# Patient Record
Sex: FEMALE
[De-identification: names, ages, dates, MRNs, and addresses within clinical notes are randomized per-mention and may not be internally consistent; named-entity substitution may affect disease eponyms.]

---

## 2018-01-08 ENCOUNTER — APPOINTMENT (OUTPATIENT)
Dept: ENDOCRINOLOGY | Facility: CLINIC | Age: 38
End: 2018-01-08
Payer: SELF-PAY

## 2018-01-08 VITALS
HEART RATE: 73 BPM | WEIGHT: 143 LBS | HEIGHT: 65 IN | SYSTOLIC BLOOD PRESSURE: 144 MMHG | DIASTOLIC BLOOD PRESSURE: 93 MMHG | TEMPERATURE: 98.2 F | BODY MASS INDEX: 23.82 KG/M2 | OXYGEN SATURATION: 100 %

## 2018-01-08 DIAGNOSIS — E55.9 VITAMIN D DEFICIENCY, UNSPECIFIED: ICD-10-CM

## 2018-01-08 DIAGNOSIS — E04.2 NONTOXIC MULTINODULAR GOITER: ICD-10-CM

## 2018-01-08 PROCEDURE — 99204 OFFICE O/P NEW MOD 45 MIN: CPT

## 2018-01-08 RX ORDER — FERROUS SULFATE 325(65) MG
325 (65 FE) TABLET ORAL
Refills: 0 | Status: ACTIVE | COMMUNITY
Start: 2018-01-08

## 2018-01-16 LAB
THYROGLOB AB SERPL-ACNC: 66.7 IU/ML
THYROPEROXIDASE AB SERPL IA-ACNC: 330 IU/ML

## 2018-06-20 ENCOUNTER — LABORATORY RESULT (OUTPATIENT)
Age: 38
End: 2018-06-20

## 2018-06-20 ENCOUNTER — APPOINTMENT (OUTPATIENT)
Dept: OBGYN | Facility: CLINIC | Age: 38
End: 2018-06-20
Payer: COMMERCIAL

## 2018-06-20 VITALS
DIASTOLIC BLOOD PRESSURE: 80 MMHG | HEIGHT: 65 IN | SYSTOLIC BLOOD PRESSURE: 120 MMHG | BODY MASS INDEX: 24.16 KG/M2 | WEIGHT: 145 LBS

## 2018-06-20 DIAGNOSIS — Z00.00 ENCOUNTER FOR GENERAL ADULT MEDICAL EXAMINATION W/OUT ABNORMAL FINDINGS: ICD-10-CM

## 2018-06-20 PROCEDURE — 99203 OFFICE O/P NEW LOW 30 MIN: CPT

## 2018-06-21 RX ORDER — CHLORHEXIDINE GLUCONATE 4 %
325 (65 FE) LIQUID (ML) TOPICAL 3 TIMES DAILY
Qty: 90 | Refills: 3 | Status: ACTIVE | COMMUNITY
Start: 2018-06-21 | End: 1900-01-01

## 2018-06-22 LAB — TSH SERPL-ACNC: 1.89 UIU/ML

## 2018-06-26 LAB — ANTI-MUELLERIAN HORMONE: 2.97 NG/ML

## 2019-01-17 ENCOUNTER — APPOINTMENT (OUTPATIENT)
Dept: OBGYN | Facility: CLINIC | Age: 39
End: 2019-01-17
Payer: COMMERCIAL

## 2019-01-17 DIAGNOSIS — R94.31 ABNORMAL ELECTROCARDIOGRAM [ECG] [EKG]: ICD-10-CM

## 2019-01-17 PROCEDURE — 99212 OFFICE O/P EST SF 10 MIN: CPT

## 2019-03-22 ENCOUNTER — APPOINTMENT (OUTPATIENT)
Dept: OBGYN | Facility: CLINIC | Age: 39
End: 2019-03-22

## 2019-04-10 ENCOUNTER — CHART COPY (OUTPATIENT)
Age: 39
End: 2019-04-10

## 2019-05-22 ENCOUNTER — APPOINTMENT (OUTPATIENT)
Dept: OBGYN | Facility: CLINIC | Age: 39
End: 2019-05-22

## 2019-07-22 ENCOUNTER — APPOINTMENT (OUTPATIENT)
Dept: OBGYN | Facility: CLINIC | Age: 39
End: 2019-07-22
Payer: COMMERCIAL

## 2019-07-22 ENCOUNTER — LABORATORY RESULT (OUTPATIENT)
Age: 39
End: 2019-07-22

## 2019-07-22 VITALS
BODY MASS INDEX: 25.69 KG/M2 | SYSTOLIC BLOOD PRESSURE: 175 MMHG | WEIGHT: 154.19 LBS | DIASTOLIC BLOOD PRESSURE: 80 MMHG | HEIGHT: 65 IN

## 2019-07-22 PROCEDURE — 36415 COLL VENOUS BLD VENIPUNCTURE: CPT

## 2019-07-24 LAB
BASOPHILS # BLD AUTO: 0.13 K/UL
BASOPHILS NFR BLD AUTO: 1.6 %
EOSINOPHIL # BLD AUTO: 0.04 K/UL
EOSINOPHIL NFR BLD AUTO: 0.5 %
HCT VFR BLD CALC: 33.5 %
HGB BLD-MCNC: 9.3 G/DL
IMM GRANULOCYTES NFR BLD AUTO: 0.2 %
LYMPHOCYTES # BLD AUTO: 1.82 K/UL
LYMPHOCYTES NFR BLD AUTO: 22.1 %
MAN DIFF?: NORMAL
MCHC RBC-ENTMCNC: 21.8 PG
MCHC RBC-ENTMCNC: 27.8 GM/DL
MCV RBC AUTO: 78.6 FL
MONOCYTES # BLD AUTO: 0.46 K/UL
MONOCYTES NFR BLD AUTO: 5.6 %
NEUTROPHILS # BLD AUTO: 5.75 K/UL
NEUTROPHILS NFR BLD AUTO: 70 %
PLATELET # BLD AUTO: 414 K/UL
RBC # BLD: 4.26 M/UL
RBC # FLD: 15.3 %
WBC # FLD AUTO: 8.22 K/UL

## 2019-08-05 ENCOUNTER — APPOINTMENT (OUTPATIENT)
Dept: OBGYN | Facility: CLINIC | Age: 39
End: 2019-08-05
Payer: COMMERCIAL

## 2019-08-05 DIAGNOSIS — D25.9 LEIOMYOMA OF UTERUS, UNSPECIFIED: ICD-10-CM

## 2019-08-05 DIAGNOSIS — D50.9 IRON DEFICIENCY ANEMIA, UNSPECIFIED: ICD-10-CM

## 2019-08-05 PROCEDURE — 99212 OFFICE O/P EST SF 10 MIN: CPT

## 2019-08-06 PROBLEM — D50.9 ANEMIA, IRON DEFICIENCY: Status: ACTIVE | Noted: 2018-01-08

## 2019-08-06 PROBLEM — D25.9 FIBROID UTERUS: Status: ACTIVE | Noted: 2018-01-08

## 2019-08-07 RX ORDER — LEUPROLIDE ACETATE 22.5 MG
22.5 KIT INTRAMUSCULAR ONCE
Qty: 1 | Refills: 0 | Status: ACTIVE | COMMUNITY
Start: 2019-08-07 | End: 1900-01-01

## 2019-09-05 RX ORDER — LEUPROLIDE ACETATE 11.25 MG
11.25 KIT INTRAMUSCULAR
Qty: 1 | Refills: 1 | Status: ACTIVE | COMMUNITY
Start: 2019-09-05 | End: 1900-01-01

## 2019-09-26 ENCOUNTER — APPOINTMENT (OUTPATIENT)
Dept: OBGYN | Facility: CLINIC | Age: 39
End: 2019-09-26
Payer: COMMERCIAL

## 2019-09-26 PROCEDURE — 96372 THER/PROPH/DIAG INJ SC/IM: CPT

## 2020-05-01 ENCOUNTER — APPOINTMENT (OUTPATIENT)
Dept: OBGYN | Facility: CLINIC | Age: 40
End: 2020-05-01

## 2024-02-18 NOTE — REVIEW OF SYSTEMS
Patient reports sore throat/swollen tonsils x 2 days, difficulty eating and drinking.    [Nl] : Integumentary